# Patient Record
Sex: MALE | Race: ASIAN | NOT HISPANIC OR LATINO | URBAN - METROPOLITAN AREA
[De-identification: names, ages, dates, MRNs, and addresses within clinical notes are randomized per-mention and may not be internally consistent; named-entity substitution may affect disease eponyms.]

---

## 2020-01-22 ENCOUNTER — EMERGENCY (EMERGENCY)
Facility: HOSPITAL | Age: 45
LOS: 0 days | Discharge: HOME | End: 2020-01-22
Attending: EMERGENCY MEDICINE
Payer: MEDICAID

## 2020-01-22 VITALS
HEART RATE: 93 BPM | WEIGHT: 199.96 LBS | DIASTOLIC BLOOD PRESSURE: 84 MMHG | SYSTOLIC BLOOD PRESSURE: 129 MMHG | RESPIRATION RATE: 16 BRPM | TEMPERATURE: 99 F | HEIGHT: 67 IN

## 2020-01-22 DIAGNOSIS — M25.561 PAIN IN RIGHT KNEE: ICD-10-CM

## 2020-01-22 DIAGNOSIS — M25.569 PAIN IN UNSPECIFIED KNEE: ICD-10-CM

## 2020-01-22 DIAGNOSIS — M25.50 PAIN IN UNSPECIFIED JOINT: ICD-10-CM

## 2020-01-22 PROCEDURE — 99283 EMERGENCY DEPT VISIT LOW MDM: CPT

## 2020-01-22 PROCEDURE — 73562 X-RAY EXAM OF KNEE 3: CPT | Mod: 26,RT

## 2020-01-22 RX ORDER — TRAMADOL HYDROCHLORIDE 50 MG/1
50 TABLET ORAL ONCE
Refills: 0 | Status: DISCONTINUED | OUTPATIENT
Start: 2020-01-22 | End: 2020-01-22

## 2020-01-22 RX ADMIN — TRAMADOL HYDROCHLORIDE 50 MILLIGRAM(S): 50 TABLET ORAL at 08:40

## 2020-01-22 NOTE — ED PROVIDER NOTE - LOWER EXTREMITY EXAM, RIGHT
right anteromedial tenderness + clicks no swelling able to fully flex and extend the right knee but increases tenderness with extension/CLICK/TENDERNESS

## 2020-01-22 NOTE — ED PROVIDER NOTE - PROGRESS NOTE DETAILS
Pt is alert and oriented x 3 xray of right knee no acute fx or dislocation with small effusion. Pt's right knee is placed on the knee immobilizer and given a cane and advised to follow up with Dr. Browning orthopedist for possible out patient mri of the right knee and return if symptoms persist or worsen.

## 2020-01-22 NOTE — ED PROVIDER NOTE - PATIENT PORTAL LINK FT
You can access the FollowMyHealth Patient Portal offered by [facility] by registering at the following website: [email or web address]. By joining [employer]’s FollowMyHealth portal, you will also be able to view your health information using other applications (apps) compatible with our system.

## 2020-01-22 NOTE — ED ADULT NURSE NOTE - OBJECTIVE STATEMENT
received pt to bed 24 alert and oriented times 4. pt complaining of right knee pain and swelling since last night. denies trauma. pt evaluated by md. pt undressed. will sent for x ray.

## 2020-01-22 NOTE — ED PROVIDER NOTE - OBJECTIVE STATEMENT
44 years old male c/o right knee pinpoint pain increases to extend to right knee since last night. Pt sts he is a . Pt denies recent hx of trauma, recent long traveling, right leg swelling or calf pain, headache, dizziness, palpitations, blurred visions, light sensitivities, focal/distal weakness or numbness, cough, sob, chest pain, neck/back/hips pain, cough, nausea, vomiting, fever, chills, abd pain, dysuria, or irregular bowel movements.

## 2020-01-22 NOTE — ED PROVIDER NOTE - CONSTITUTIONAL, MLM
normal... Well appearing, awake, alert, oriented to person, place, time/situation and in no apparent distress. Speaking in clear full sentences no nasal flaring no shoulders retractions no diaphoresis, appears comfortable sitting up in the stretcher in a  bright light room

## 2020-01-28 ENCOUNTER — APPOINTMENT (OUTPATIENT)
Dept: ORTHOPEDIC SURGERY | Facility: CLINIC | Age: 45
End: 2020-01-28

## 2020-01-28 NOTE — PHYSICAL EXAM
[de-identified] : General Exam\par \par Well developed, well nourished\par No apparent distress\par Oriented to person, place, and time\par Mood: Normal\par Affect: Normal\par Balance and coordination: Normal\par Gait: Normal\par \par right knee exam\par \par Skin: Clean, dry, intact\par Inspection: No obvious malalignment, no masses, no swelling, no effusion.\par Tenderness: no MJLT. No LJLT. No tenderness over the medial and lateral patella facets. No ttp medial/lateral epicondyle, patella tendon, tibial tubercle, pes anserinus, or proximal fibula.\par ROM: 0 to 130° no pain with deep flexion in both knees\par Stability: Stable to varus, valgus, lachman testing. Negative anterior/posterior drawer.\par Additional tests: Negative McMurrays test, Negative patellar grind test. \par Strength: 5/5 Q/H/TA/GS/EHL, no atrophy\par Neuro: In tact to light touch throughout in dp/sp/tib/marianne/saph nerve districutions, DTR's normal\par Pulses: 2+ DP/PT pulses.\par

## 2020-01-28 NOTE — HISTORY OF PRESENT ILLNESS
[de-identified] : \par \par The patient's past medical history, past surgical history, medications, allergies, and social history were reviewed by me today with the patient and documented accordingly. In addition, the patient's family history, which is noncontributory to this visit, was also reviewed.\par

## 2020-03-01 ENCOUNTER — EMERGENCY (EMERGENCY)
Facility: HOSPITAL | Age: 45
LOS: 0 days | Discharge: HOME | End: 2020-03-01
Attending: EMERGENCY MEDICINE
Payer: MEDICAID

## 2020-03-01 VITALS
RESPIRATION RATE: 18 BRPM | SYSTOLIC BLOOD PRESSURE: 116 MMHG | OXYGEN SATURATION: 97 % | TEMPERATURE: 98 F | DIASTOLIC BLOOD PRESSURE: 79 MMHG | HEART RATE: 93 BPM

## 2020-03-01 VITALS
DIASTOLIC BLOOD PRESSURE: 99 MMHG | TEMPERATURE: 99 F | WEIGHT: 203.93 LBS | SYSTOLIC BLOOD PRESSURE: 134 MMHG | RESPIRATION RATE: 20 BRPM | HEIGHT: 67 IN | HEART RATE: 93 BPM | OXYGEN SATURATION: 95 %

## 2020-03-01 DIAGNOSIS — R05 COUGH: ICD-10-CM

## 2020-03-01 DIAGNOSIS — R09.81 NASAL CONGESTION: ICD-10-CM

## 2020-03-01 DIAGNOSIS — R09.82 POSTNASAL DRIP: ICD-10-CM

## 2020-03-01 DIAGNOSIS — I10 ESSENTIAL (PRIMARY) HYPERTENSION: ICD-10-CM

## 2020-03-01 DIAGNOSIS — R06.02 SHORTNESS OF BREATH: ICD-10-CM

## 2020-03-01 PROCEDURE — 99284 EMERGENCY DEPT VISIT MOD MDM: CPT

## 2020-03-01 RX ORDER — OXYMETAZOLINE HYDROCHLORIDE 0.5 MG/ML
1 SPRAY NASAL ONCE
Refills: 0 | Status: COMPLETED | OUTPATIENT
Start: 2020-03-01 | End: 2020-03-01

## 2020-03-01 RX ORDER — LORATADINE 10 MG/1
10 TABLET ORAL ONCE
Refills: 0 | Status: COMPLETED | OUTPATIENT
Start: 2020-03-01 | End: 2020-03-01

## 2020-03-01 RX ORDER — CETIRIZINE HYDROCHLORIDE 10 MG/1
1 TABLET ORAL
Qty: 10 | Refills: 0
Start: 2020-03-01 | End: 2020-03-10

## 2020-03-01 RX ORDER — IPRATROPIUM/ALBUTEROL SULFATE 18-103MCG
3 AEROSOL WITH ADAPTER (GRAM) INHALATION ONCE
Refills: 0 | Status: COMPLETED | OUTPATIENT
Start: 2020-03-01 | End: 2020-03-01

## 2020-03-01 RX ORDER — BENZOCAINE AND MENTHOL 5; 1 G/100ML; G/100ML
1 LIQUID ORAL ONCE
Refills: 0 | Status: COMPLETED | OUTPATIENT
Start: 2020-03-01 | End: 2020-03-01

## 2020-03-01 RX ORDER — FLUTICASONE PROPIONATE 50 MCG
1 SPRAY, SUSPENSION NASAL ONCE
Refills: 0 | Status: COMPLETED | OUTPATIENT
Start: 2020-03-01 | End: 2020-03-01

## 2020-03-01 RX ORDER — ACETAMINOPHEN 500 MG
650 TABLET ORAL ONCE
Refills: 0 | Status: COMPLETED | OUTPATIENT
Start: 2020-03-01 | End: 2020-03-01

## 2020-03-01 RX ADMIN — OXYMETAZOLINE HYDROCHLORIDE 1 SPRAY(S): 0.5 SPRAY NASAL at 09:19

## 2020-03-01 RX ADMIN — Medication 3 MILLILITER(S): at 09:20

## 2020-03-01 RX ADMIN — BENZOCAINE AND MENTHOL 1 LOZENGE: 5; 1 LIQUID ORAL at 09:19

## 2020-03-01 RX ADMIN — Medication 650 MILLIGRAM(S): at 09:16

## 2020-03-01 RX ADMIN — Medication 1 SPRAY(S): at 09:19

## 2020-03-01 RX ADMIN — LORATADINE 10 MILLIGRAM(S): 10 TABLET ORAL at 09:19

## 2020-03-01 NOTE — ED ADULT TRIAGE NOTE - CHIEF COMPLAINT QUOTE
Pt presents with cough and congestion x 2 weeks. Went to urgent care last Sunday and was given a nasal spray without relief.

## 2020-03-01 NOTE — ED PROVIDER NOTE - PHYSICAL EXAMINATION
Gen: Alert, Well appearing. NAD    Head: NC, AT, PERRL, normal lids/conjunctiva   ENT: patent oropharynx without erythema/exudate, uvula midline, ++ nasal congestion. no significant sinus pressure/tenderness  Neck: supple, no tenderness/meningismus  Pulm: Bilateral clear BS, normal resp effort  CV: RRR, no M/R/G, +dist pulses   Abd: soft, NT/ND, +BS, no guarding/rebound tenderness  Mskel: no edema/erythema/cyanosis   Skin: no rash, no bruising  Neuro: AAOx3, no sensory/motor deficits, CN 2-12 intact,

## 2020-03-01 NOTE — ED PROVIDER NOTE - OBJECTIVE STATEMENT
Pertinent PMH/PSH/FHx/SHx and Review of Systems contained within:     45 y/o M with no pertinent pmhx presents with x2 weeks nasal congestion and pressure. Pt also gets irritated throat and dry cough. Pt reports some occasional SOB related to nasal congestion. Pt was seen at urgent care and was given nasal spray with no relief. Pt denies fevers, CP, palpitations, N/V, body aches, or allergies.     No fever/chills, No photophobia/eye pain/changes in vision, + nasal congestion, No ear pain,  + irritated throat, no dysphagia, No chest pain/palpitations, no SOB, + cough, no wheeze/stridor, No abdominal pain, No N/V/D, no dysuria/frequency/discharge, No neck/back pain, no rash, no changes in neurological status/function. Pertinent PMH/PSH/FHx/SHx and Review of Systems contained within:     43 y/o M with no pertinent pmhx presents with x2 weeks nasal congestion and pressure. Pt also gets irritated throat and dry cough. Pt reports some occasional SOB related to nasal congestion. Pt was seen at urgent care and was given Azelastine nasal spray with no relief. Pt denies fevers, CP, palpitations, N/V, body aches, or allergies.     No fever/chills, No photophobia/eye pain/changes in vision, + nasal congestion, No ear pain,  + irritated throat, no dysphagia, No chest pain/palpitations, no SOB, + cough, no wheeze/stridor, No abdominal pain, No N/V/D, no dysuria/frequency/discharge, No neck/back pain, no rash, no changes in neurological status/function. Pertinent PMH/PSH/FHx/SHx and Review of Systems contained within:     45 y/o M with pmhx HTN presents with x2 weeks nasal congestion and pressure. Pt also gets irritated throat and dry cough. Pt reports some occasional SOB related to nasal congestion. Pt was seen at urgent care and was given Azelastine nasal spray with no relief. Pt denies fevers, CP, palpitations, N/V, body aches, or allergies.     No fever/chills, No photophobia/eye pain/changes in vision, + nasal congestion, No ear pain,  + irritated throat, no dysphagia, No chest pain/palpitations, no SOB, + cough, no wheeze/stridor, No abdominal pain, No N/V/D, no dysuria/frequency/discharge, No neck/back pain, no rash, no changes in neurological status/function.

## 2020-03-01 NOTE — ED ADULT NURSE NOTE - NSIMPLEMENTINTERV_GEN_ALL_ED
Implemented All Universal Safety Interventions:  [place] to call system. Call bell, personal items and telephone within reach. Instruct patient to call for assistance. Room bathroom lighting operational. Non-slip footwear when patient is off stretcher. Physically safe environment: no spills, clutter or unnecessary equipment. Stretcher in lowest position, wheels locked, appropriate side rails in place.

## 2020-03-01 NOTE — ED PROVIDER NOTE - CARE PROVIDER_API CALL
Cuco Malik)  Otolaryngology  [address] [place] [address]  Phone: [phone]  Fax: [phone]  Follow Up Time:

## 2020-03-01 NOTE — ED PROVIDER NOTE - NSFOLLOWUPINSTRUCTIONS_ED_ALL_ED_FT
Oxymetazolone (Afrin), You can take 2 sprays each nostril twice a day as needed for nasal congestion. DO NOT TAKE MORE THAN 3 days.     Fluticasone nasal spray: Spray both nostrils once a day for 30 days.     Take the cetirizine once a day for the next days.     Follow up with your primary care doctor within the next 24-48 hours and bring copy of your results.  Return to the Emergency Department for worsening or persistent symptoms or any other concerns incl. chest pain, shortness of breath, dizziness, inability to tolerate oral intake, fever.  Rest, drink plenty of fluids.  Advance activity as tolerated.  Continue all previously prescribed medications as directed. You can use Tylenol 650 mg every 4 hours for pain

## 2020-03-01 NOTE — ED PROVIDER NOTE - PATIENT PORTAL LINK FT
You can access the FollowMyHealth Patient Portal offered by [facility] by registering at the following website: [email or web address]. By joining [email or web address]’s FollowMyHealth portal, you will also be able to view your health information using other applications (apps) compatible with our system.

## 2021-05-25 NOTE — ED PROVIDER NOTE - CROS ED GU ALL NEG
Patient: Luis Fernando Araujo Date of Service: 2021   : 1988 MRN: [id number]     SUBJECTIVE:   HISTORY OF PRESENT ILLNESS:  Luis Fernando Araujo is a 33 year old male who presents today for knee and back issues with his wife Daisy.    Hunched over all his life  - no pain at rest  - when straightens back, has achiness  - when younger, parents were told they'd have to break his spine and reposition it really fix the posture; they declined at that time  - does not want it to worsen when he is older  - back braces in past not helpful    Left leg/left hip pain  - for months  - radiates to left buttocks  - outer part of leg as well as inner hip  - no numbness/tingling  - does not routinely exercise  - does work around the house, squats  - pain then worsens  - does not stretch in general either  - gait not affected  - sleep not disrupted by pain from leg  - not really taking meds for it either        PAST MEDICAL HISTORY:  Past Medical History:   Diagnosis Date   • Intracranial injury with loss of consciousness of 30 minutes or less (CMS/[facility]) 2018   • Post concussion syndrome 2017       MEDICATIONS:  No current outpatient medications on file.     No current facility-administered medications for this visit.       ALLERGIES:  No Known Allergies    PAST SURGICAL HISTORY:  History reviewed. No pertinent surgical history.    FAMILY HISTORY:  History reviewed. No pertinent family history.    SOCIAL HISTORY:  Social History     Tobacco Use   • Smoking status: Current Some Day Smoker     Types: Cigarettes   • Smokeless tobacco: Never Used   Substance Use Topics   • Alcohol use: Yes     Comment: occ   • Drug use: Yes     Types: Marijuana       Review of Systems   Constitutional: Negative for chills, fever and malaise/fatigue.   HENT: Negative.    Eyes: Negative for blurred vision, discharge and redness.   Respiratory: Negative for cough, shortness of breath and wheezing.    Cardiovascular: Negative for chest pain,  palpitations and leg swelling.   Gastrointestinal: Negative.    Genitourinary: Negative.    Musculoskeletal: Negative for falls.   Skin: Negative for rash.   Neurological: Negative for dizziness, sensory change, speech change, weakness and headaches.   Endo/Heme/Allergies: Negative.    Psychiatric/Behavioral: Negative.    All other systems reviewed and are negative.        OBJECTIVE:     Visit Vitals  /60 (BP Location: LUE - Left upper extremity, Patient Position: Sitting, Cuff Size: Regular)   Pulse 78   Temp 97.6 °F (36.4 °C) (Temporal)   Ht 6' 3.75\" (1.924 m)   Wt 62.1 kg (137 lb)   SpO2 99%   BMI 16.79 kg/m²       Physical Exam   Constitutional: He is oriented to person, place, and time and well-developed, well-nourished, and in no distress.   HENT:   Head: Normocephalic and atraumatic.   Right Ear: External ear normal.   Left Ear: External ear normal.   Nose: Nose normal.   Eyes: Conjunctivae and EOM are normal. Right eye exhibits no discharge. Left eye exhibits no discharge. No scleral icterus.   Pulmonary/Chest: Effort normal. He exhibits deformity (kyphotic chest).   Musculoskeletal:         General: No edema. Normal range of motion.      Cervical back: Normal range of motion and neck supple.      Thoracic back: Deformity (severe kyphosis with anteriorly rotated shoulders bilaterally at resting position) present. No swelling, edema, tenderness or bony tenderness. Normal range of motion.      Left hip: Tenderness (ITB at insertion at hip and along ITB; also TTP of VMO/hip flexors & extensors) present. No swelling, deformity or bony tenderness. Normal range of motion. Normal strength (negative ANAHI).   Neurological: He is alert and oriented to person, place, and time. He has normal reflexes. No cranial nerve deficit. Gait normal. Coordination normal.   Skin: Skin is warm and dry. No rash noted. He is not diaphoretic. No erythema. No pallor.   Psychiatric: Mood, memory, affect and judgment normal.    Vitals reviewed.      DIAGNOSTIC STUDIES:   LAB RESULTS:    No results found for this visit on 05/25/21.    Assessment AND PLAN:     Problem List Items Addressed This Visit        Nervous    Left leg pain     IT band syndrome - will try foam roller at least nightly at end of day.    Also strain(s) of VMO vs hip extensors vs hip flexors  - does not stretch much nor exercise routinely  - demonstrated stretches to do at the end of the day  - encouraged more routine exercise  - may need PT eventually            Musculoskeletal    Kyphosis (acquired) (postural) - Primary     Does not want it to get worse as he ages.  XR ordered of entire spine.  Sending him to ortho spine surgeon at [facility] as well, Dr. Mukherjee.         Relevant Orders    XR THORACIC SPINE 3 VIEWS    XR CERVICAL SPINE 4 OR 5 VIEWS    XR LUMBAR SPINE 2 OR 3 VIEWS        Please take medications as directed.    Instructions provided as documented in the AVS.    The patient and wife indicated understanding of the diagnosis and agreed with the plan of care.    Return in about 1 month (around 6/25/2021) for physical.   negative...

## 2022-12-16 NOTE — ED PROVIDER NOTE - EYES NEGATIVE STATEMENT, MLM
Koko Baum MD  [facility] Clinical Support Staff 10 minutes ago (5:50 PM)     MS  Patient is already on steroids and cough medicine.  Provider mentioned sending in a Z-Markel if symptoms continue.  A Z-Markel has been sent to the pharmacy.  If the cough continues to worsen or patient develops fever they need to be re-evaluated immediately      You routed conversation to [facility] Results; [facility] Clinical Support Staff 2 hours ago (3:45 PM)     Ngozi Ross  [facility] Clinical Support Staff 2 hours ago (3:23 PM)     SM  Patient was seen on 12/13/2022 diagnosed with Bronchitis. The patient's mother is calling today stating that the cough is worsening and she wants to know if we can prescribe something to help? The pharmacy she uses is the [facility] in [place]. The mother's telephone is [phone].   Please Advise.          Called and spoke with pts mom and let her know that rx was sent to if he worsens to follow up- mom verbalized understanding   
no discharge, no irritation, no pain, no redness, and no visual changes.

## 2023-06-10 ENCOUNTER — INPATIENT (INPATIENT)
Facility: HOSPITAL | Age: 48
LOS: 1 days | Discharge: HOME | End: 2023-06-12
Attending: HOSPITALIST | Admitting: HOSPITALIST
Payer: MEDICAID

## 2023-06-10 VITALS
WEIGHT: 210.1 LBS | OXYGEN SATURATION: 98 % | HEIGHT: 67 IN | SYSTOLIC BLOOD PRESSURE: 153 MMHG | DIASTOLIC BLOOD PRESSURE: 96 MMHG | TEMPERATURE: 98 F | HEART RATE: 89 BPM | RESPIRATION RATE: 18 BRPM

## 2023-06-10 PROBLEM — I10 ESSENTIAL (PRIMARY) HYPERTENSION: Chronic | Status: ACTIVE | Noted: 2020-03-01

## 2023-06-10 PROCEDURE — 99285 EMERGENCY DEPT VISIT HI MDM: CPT

## 2023-06-10 NOTE — ED ADULT TRIAGE NOTE - CHIEF COMPLAINT QUOTE
PMH of HTN  C/o abdominal pain since yesterday. Denies nausea, vomiting, diarrhea. LBM today 2hrs ago.

## 2023-06-11 DIAGNOSIS — I10 ESSENTIAL (PRIMARY) HYPERTENSION: ICD-10-CM

## 2023-06-11 DIAGNOSIS — K85.90 ACUTE PANCREATITIS WITHOUT NECROSIS OR INFECTION, UNSPECIFIED: ICD-10-CM

## 2023-06-11 LAB
ALBUMIN SERPL ELPH-MCNC: 3.5 G/DL — SIGNIFICANT CHANGE UP (ref 3.3–5)
ALP SERPL-CCNC: 71 U/L — SIGNIFICANT CHANGE UP (ref 40–120)
ALT FLD-CCNC: 38 U/L — SIGNIFICANT CHANGE UP (ref 12–78)
ANION GAP SERPL CALC-SCNC: 6 MMOL/L — SIGNIFICANT CHANGE UP (ref 5–17)
APPEARANCE UR: CLEAR — SIGNIFICANT CHANGE UP
AST SERPL-CCNC: 17 U/L — SIGNIFICANT CHANGE UP (ref 15–37)
BASOPHILS # BLD AUTO: 0.1 K/UL — SIGNIFICANT CHANGE UP (ref 0–0.2)
BASOPHILS NFR BLD AUTO: 1.1 % — SIGNIFICANT CHANGE UP (ref 0–2)
BILIRUB SERPL-MCNC: 1.3 MG/DL — HIGH (ref 0.2–1.2)
BILIRUB UR-MCNC: NEGATIVE — SIGNIFICANT CHANGE UP
BLD GP AB SCN SERPL QL: SIGNIFICANT CHANGE UP
BUN SERPL-MCNC: 11 MG/DL — SIGNIFICANT CHANGE UP (ref 7–23)
CALCIUM SERPL-MCNC: 9.4 MG/DL — SIGNIFICANT CHANGE UP (ref 8.5–10.1)
CHLORIDE SERPL-SCNC: 105 MMOL/L — SIGNIFICANT CHANGE UP (ref 96–108)
CO2 SERPL-SCNC: 29 MMOL/L — SIGNIFICANT CHANGE UP (ref 22–31)
COLOR SPEC: YELLOW — SIGNIFICANT CHANGE UP
CREAT SERPL-MCNC: 1.15 MG/DL — SIGNIFICANT CHANGE UP (ref 0.5–1.3)
DIFF PNL FLD: NEGATIVE — SIGNIFICANT CHANGE UP
EGFR: 78 ML/MIN/1.73M2 — SIGNIFICANT CHANGE UP
EOSINOPHIL # BLD AUTO: 0.57 K/UL — HIGH (ref 0–0.5)
EOSINOPHIL NFR BLD AUTO: 6.3 % — HIGH (ref 0–6)
GLUCOSE SERPL-MCNC: 100 MG/DL — HIGH (ref 70–99)
GLUCOSE UR QL: NEGATIVE MG/DL — SIGNIFICANT CHANGE UP
HCT VFR BLD CALC: 47.1 % — SIGNIFICANT CHANGE UP (ref 39–50)
HGB BLD-MCNC: 15.7 G/DL — SIGNIFICANT CHANGE UP (ref 13–17)
IMM GRANULOCYTES NFR BLD AUTO: 0.2 % — SIGNIFICANT CHANGE UP (ref 0–0.9)
KETONES UR-MCNC: NEGATIVE — SIGNIFICANT CHANGE UP
LACTATE SERPL-SCNC: 0.9 MMOL/L — SIGNIFICANT CHANGE UP (ref 0.7–2)
LEUKOCYTE ESTERASE UR-ACNC: NEGATIVE — SIGNIFICANT CHANGE UP
LIDOCAIN IGE QN: 3222 U/L — HIGH (ref 73–393)
LYMPHOCYTES # BLD AUTO: 1.87 K/UL — SIGNIFICANT CHANGE UP (ref 1–3.3)
LYMPHOCYTES # BLD AUTO: 20.7 % — SIGNIFICANT CHANGE UP (ref 13–44)
MCHC RBC-ENTMCNC: 29.7 PG — SIGNIFICANT CHANGE UP (ref 27–34)
MCHC RBC-ENTMCNC: 33.3 G/DL — SIGNIFICANT CHANGE UP (ref 32–36)
MCV RBC AUTO: 89.2 FL — SIGNIFICANT CHANGE UP (ref 80–100)
MONOCYTES # BLD AUTO: 0.79 K/UL — SIGNIFICANT CHANGE UP (ref 0–0.9)
MONOCYTES NFR BLD AUTO: 8.8 % — SIGNIFICANT CHANGE UP (ref 2–14)
NEUTROPHILS # BLD AUTO: 5.67 K/UL — SIGNIFICANT CHANGE UP (ref 1.8–7.4)
NEUTROPHILS NFR BLD AUTO: 62.9 % — SIGNIFICANT CHANGE UP (ref 43–77)
NITRITE UR-MCNC: NEGATIVE — SIGNIFICANT CHANGE UP
NRBC # BLD: 0 /100 WBCS — SIGNIFICANT CHANGE UP (ref 0–0)
PH UR: 7 — SIGNIFICANT CHANGE UP (ref 5–8)
PLATELET # BLD AUTO: 303 K/UL — SIGNIFICANT CHANGE UP (ref 150–400)
POTASSIUM SERPL-MCNC: 4.3 MMOL/L — SIGNIFICANT CHANGE UP (ref 3.5–5.3)
POTASSIUM SERPL-SCNC: 4.3 MMOL/L — SIGNIFICANT CHANGE UP (ref 3.5–5.3)
PROT SERPL-MCNC: 7 GM/DL — SIGNIFICANT CHANGE UP (ref 6–8.3)
PROT UR-MCNC: NEGATIVE MG/DL — SIGNIFICANT CHANGE UP
RAPID RVP RESULT: SIGNIFICANT CHANGE UP
RBC # BLD: 5.28 M/UL — SIGNIFICANT CHANGE UP (ref 4.2–5.8)
RBC # FLD: 13.2 % — SIGNIFICANT CHANGE UP (ref 10.3–14.5)
SARS-COV-2 RNA SPEC QL NAA+PROBE: SIGNIFICANT CHANGE UP
SODIUM SERPL-SCNC: 140 MMOL/L — SIGNIFICANT CHANGE UP (ref 135–145)
SP GR SPEC: 1.01 — SIGNIFICANT CHANGE UP (ref 1.01–1.02)
TROPONIN I, HIGH SENSITIVITY RESULT: 4.1 NG/L — SIGNIFICANT CHANGE UP
UROBILINOGEN FLD QL: NEGATIVE MG/DL — SIGNIFICANT CHANGE UP
WBC # BLD: 9.02 K/UL — SIGNIFICANT CHANGE UP (ref 3.8–10.5)
WBC # FLD AUTO: 9.02 K/UL — SIGNIFICANT CHANGE UP (ref 3.8–10.5)

## 2023-06-11 PROCEDURE — 93010 ELECTROCARDIOGRAM REPORT: CPT

## 2023-06-11 PROCEDURE — 74177 CT ABD & PELVIS W/CONTRAST: CPT | Mod: 26,MA

## 2023-06-11 PROCEDURE — 99223 1ST HOSP IP/OBS HIGH 75: CPT

## 2023-06-11 PROCEDURE — 71045 X-RAY EXAM CHEST 1 VIEW: CPT | Mod: 26

## 2023-06-11 RX ORDER — ONDANSETRON 8 MG/1
4 TABLET, FILM COATED ORAL EVERY 8 HOURS
Refills: 0 | Status: DISCONTINUED | OUTPATIENT
Start: 2023-06-11 | End: 2023-06-12

## 2023-06-11 RX ORDER — LOSARTAN POTASSIUM 100 MG/1
0 TABLET, FILM COATED ORAL
Qty: 0 | Refills: 0 | DISCHARGE

## 2023-06-11 RX ORDER — MORPHINE SULFATE 50 MG/1
4 CAPSULE, EXTENDED RELEASE ORAL EVERY 6 HOURS
Refills: 0 | Status: DISCONTINUED | OUTPATIENT
Start: 2023-06-11 | End: 2023-06-12

## 2023-06-11 RX ORDER — NALOXONE HYDROCHLORIDE 4 MG/.1ML
0.4 SPRAY NASAL ONCE
Refills: 0 | Status: DISCONTINUED | OUTPATIENT
Start: 2023-06-11 | End: 2023-06-12

## 2023-06-11 RX ORDER — IOHEXOL 300 MG/ML
30 INJECTION, SOLUTION INTRAVENOUS ONCE
Refills: 0 | Status: COMPLETED | OUTPATIENT
Start: 2023-06-11 | End: 2023-06-11

## 2023-06-11 RX ORDER — AMLODIPINE BESYLATE 2.5 MG/1
5 TABLET ORAL DAILY
Refills: 0 | Status: DISCONTINUED | OUTPATIENT
Start: 2023-06-11 | End: 2023-06-12

## 2023-06-11 RX ORDER — AMLODIPINE BESYLATE 2.5 MG/1
1 TABLET ORAL
Qty: 0 | Refills: 0 | DISCHARGE

## 2023-06-11 RX ORDER — MORPHINE SULFATE 50 MG/1
2 CAPSULE, EXTENDED RELEASE ORAL EVERY 6 HOURS
Refills: 0 | Status: DISCONTINUED | OUTPATIENT
Start: 2023-06-11 | End: 2023-06-12

## 2023-06-11 RX ORDER — MORPHINE SULFATE 50 MG/1
2 CAPSULE, EXTENDED RELEASE ORAL ONCE
Refills: 0 | Status: DISCONTINUED | OUTPATIENT
Start: 2023-06-11 | End: 2023-06-11

## 2023-06-11 RX ORDER — ACETAMINOPHEN 500 MG
650 TABLET ORAL EVERY 6 HOURS
Refills: 0 | Status: DISCONTINUED | OUTPATIENT
Start: 2023-06-11 | End: 2023-06-12

## 2023-06-11 RX ORDER — PANTOPRAZOLE SODIUM 20 MG/1
40 TABLET, DELAYED RELEASE ORAL DAILY
Refills: 0 | Status: DISCONTINUED | OUTPATIENT
Start: 2023-06-11 | End: 2023-06-12

## 2023-06-11 RX ORDER — ACETAMINOPHEN 500 MG
975 TABLET ORAL ONCE
Refills: 0 | Status: COMPLETED | OUTPATIENT
Start: 2023-06-11 | End: 2023-06-11

## 2023-06-11 RX ORDER — SODIUM CHLORIDE 9 MG/ML
1000 INJECTION INTRAMUSCULAR; INTRAVENOUS; SUBCUTANEOUS
Refills: 0 | Status: DISCONTINUED | OUTPATIENT
Start: 2023-06-11 | End: 2023-06-12

## 2023-06-11 RX ORDER — LANOLIN ALCOHOL/MO/W.PET/CERES
3 CREAM (GRAM) TOPICAL AT BEDTIME
Refills: 0 | Status: DISCONTINUED | OUTPATIENT
Start: 2023-06-11 | End: 2023-06-12

## 2023-06-11 RX ORDER — SODIUM CHLORIDE 9 MG/ML
1000 INJECTION INTRAMUSCULAR; INTRAVENOUS; SUBCUTANEOUS ONCE
Refills: 0 | Status: COMPLETED | OUTPATIENT
Start: 2023-06-11 | End: 2023-06-11

## 2023-06-11 RX ADMIN — Medication 30 MILLILITER(S): at 01:00

## 2023-06-11 RX ADMIN — AMLODIPINE BESYLATE 5 MILLIGRAM(S): 2.5 TABLET ORAL at 06:24

## 2023-06-11 RX ADMIN — SODIUM CHLORIDE 125 MILLILITER(S): 9 INJECTION INTRAMUSCULAR; INTRAVENOUS; SUBCUTANEOUS at 06:26

## 2023-06-11 RX ADMIN — Medication 975 MILLIGRAM(S): at 00:59

## 2023-06-11 RX ADMIN — MORPHINE SULFATE 2 MILLIGRAM(S): 50 CAPSULE, EXTENDED RELEASE ORAL at 02:58

## 2023-06-11 RX ADMIN — IOHEXOL 30 MILLILITER(S): 300 INJECTION, SOLUTION INTRAVENOUS at 01:00

## 2023-06-11 RX ADMIN — PANTOPRAZOLE SODIUM 40 MILLIGRAM(S): 20 TABLET, DELAYED RELEASE ORAL at 11:48

## 2023-06-11 RX ADMIN — SODIUM CHLORIDE 125 MILLILITER(S): 9 INJECTION INTRAMUSCULAR; INTRAVENOUS; SUBCUTANEOUS at 18:13

## 2023-06-11 RX ADMIN — SODIUM CHLORIDE 1000 MILLILITER(S): 9 INJECTION INTRAMUSCULAR; INTRAVENOUS; SUBCUTANEOUS at 00:59

## 2023-06-11 NOTE — ED PROVIDER NOTE - CLINICAL SUMMARY MEDICAL DECISION MAKING FREE TEXT BOX
47 yo M with epigastric abd pain x 2 days, concerning for pancreatitis, acs, doubt ischemic bowel, covid  -basic labs, type and screen, ua, cx, lactate, lipase, rvp, trop, CT ab/pel, CXR, ekg, iv, hydration, tylenol, maalox, npo, monitor  -f/u results, reeval

## 2023-06-11 NOTE — PATIENT PROFILE ADULT - FALL HARM RISK - UNIVERSAL INTERVENTIONS
Bed in lowest position, wheels locked, appropriate side rails in place/Call bell, personal items and telephone in reach/Instruct patient to call for assistance before getting out of bed or chair/Non-slip footwear when patient is out of bed/[place] to call system/Physically safe environment - no spills, clutter or unnecessary equipment/Purposeful Proactive Rounding/Room/bathroom lighting operational, light cord in reach

## 2023-06-11 NOTE — H&P ADULT - ASSESSMENT
47 yo M with pmh of HTN only here with  2 days of severe epigastric abd pain,  admit for pancreatitis.

## 2023-06-11 NOTE — ED PROVIDER NOTE - PROGRESS NOTE DETAILS
+ pancreatitis on CT, pt. still having epigastric pain, will admit for further care, pt. informed of results thus far, agreeable with plan of care

## 2023-06-11 NOTE — CHART NOTE - NSCHARTNOTEFT_GEN_A_CORE
Patient admitted for pancreatitis. Patient seen and examined this AM. Continues to have abdominal pain. Will continue with CLD for now. Pain control with morphine. IVF  F/u pending labs

## 2023-06-11 NOTE — ED ADULT NURSE NOTE - OBJECTIVE STATEMENT
c/o abdominal pain x2 days, pain located in the upper abdominal quadrant. denies any nausea, vomiting or diarrhea.

## 2023-06-11 NOTE — ED PROVIDER NOTE - OBJECTIVE STATEMENT
47 yo M with 2 days of severe epigastric abd pain, started after eating very spicy chips.  Pt. denies other inciting event/trauma.  Pt. feels like his epigastrium is swollen, which is not normal for him.  He feels otherwise well.  ROS: negative for fever, cough, headache, chest pain, shortness of breath, nausea, vomiting, diarrhea, rash, paresthesia, and focal weakness--all other systems reviewed are negative.   PMH: HTN; Meds: amlodipine; SH: Denies smoking/drinking/drug use

## 2023-06-11 NOTE — ED PROVIDER NOTE - PHYSICAL EXAMINATION
Vitals: HTN at 155/103, otherwise WNL  Gen: AAOx3, NAD, sitting uncomfortably in stretcher, non-toxic   Head: ncat, perrla, eomi b/l  Neck: supple, no lymphadenopathy, no midline deviation  Heart: rrr, no m/r/g  Lungs: CTA b/l, no rales/ronchi/wheezes  Abd: soft, tender in epigastrium, non-distended, no rebound or guarding  Ext: no clubbing/cyanosis/edema  Neuro: sensation and muscle strength intact b/l, steady gait

## 2023-06-11 NOTE — ED PROVIDER NOTE - CARE PLAN
1 Principal Discharge DX:	Epigastric abdominal pain   Principal Discharge DX:	Epigastric abdominal pain  Secondary Diagnosis:	Acute pancreatitis

## 2023-06-11 NOTE — H&P ADULT - NSHPREVIEWOFSYSTEMS_GEN_ALL_CORE
REVIEW OF SYSTEMS:    CONSTITUTIONAL: No weakness, fevers or chills  EYES/ENT: No visual changes;  No vertigo or throat pain   NECK: No pain or stiffness  RESPIRATORY: No cough, wheezing, hemoptysis; No shortness of breath  CARDIOVASCULAR: No chest pain or palpitations  GASTROINTESTINAL: + epigastric pain. No nausea, vomiting, or hematemesis; No diarrhea or constipation. No melena or hematochezia.  GENITOURINARY: No dysuria, frequency or hematuria  NEUROLOGICAL: No numbness or weakness  SKIN: No itching, rashes

## 2023-06-11 NOTE — H&P ADULT - NSHPPHYSICALEXAM_GEN_ALL_CORE
VITALS:   T(C): 36.8 (06-11-23 @ 06:38), Max: 36.9 (06-10-23 @ 21:20)  HR: 78 (06-11-23 @ 06:38) (78 - 89)  BP: 130/88 (06-11-23 @ 06:38) (126/85 - 155/103)  RR: 18 (06-11-23 @ 06:38) (18 - 18)  SpO2: 95% (06-11-23 @ 06:38) (95% - 98%)    GENERAL: NAD, lying in bed comfortably  HEAD:  Atraumatic, Normocephalic  EYES: EOMI, PERRLA, conjunctiva and sclera clear  ENT: Moist mucous membranes  NECK: Supple, No JVD  CHEST/LUNG: Clear to auscultation bilaterally; No rales, rhonchi, wheezing, or rubs. Unlabored respirations  HEART: Regular rate and rhythm; No murmurs, rubs, or gallops  ABDOMEN: BSx4; Soft, nontender, nondistended  EXTREMITIES:  2+ Peripheral Pulses, brisk capillary refill. No clubbing, cyanosis, or edema  NERVOUS SYSTEM:  A&Ox3, no focal deficits   SKIN: No rashes or lesions

## 2023-06-11 NOTE — H&P ADULT - HISTORY OF PRESENT ILLNESS
47 yo M with pmh of HTN only here with  2 days of severe epigastric abd pain, started after eating very spicy chips.  Pt. denies other inciting event/trauma.  Pt. feels like his epigastrium is swollen, which is not normal for him.  He feels otherwise well.: negative for fever, cough, headache, chest pain, shortness of breath, nausea, vomiting, diarrhea, rash, paresthesia, and focal weakness-    Pt denies heavy drinking or hx of pancreatitis. Denies any HLD or gallstones hx.

## 2023-06-11 NOTE — H&P ADULT - NSHPLABSRESULTS_GEN_ALL_CORE
=======================================================  Labs:                        15.7   9.02  )-----------( 303      ( 11 Jun 2023 00:56 )             47.1     06-11    140  |  105  |  11  ----------------------------<  100<H>  4.3   |  29  |  1.15    Ca    9.4      11 Jun 2023 00:56    TPro  7.0  /  Alb  3.5  /  TBili  1.3<H>  /  DBili  x   /  AST  17  /  ALT  38  /  AlkPhos  71  06-11      Creatinine, Serum: 1.15 mg/dL (06-11-23 @ 00:56)            WBC Count: 9.02 K/uL (06-11-23 @ 00:56)    SARS-CoV-2: NotDetec (06-11-23 @ 00:58)      Alkaline Phosphatase, Serum: 71 U/L (06-11-23 @ 00:56)  Alanine Aminotransferase (ALT/SGPT): 38 U/L (06-11-23 @ 00:56)  Aspartate Aminotransferase (AST/SGOT): 17 U/L (06-11-23 @ 00:56)  Bilirubin Total, Serum: 1.3 mg/dL (06-11-23 @ 00:56)      < from: CT Abdomen and Pelvis w/ Oral Cont and w/ IV Cont (06.11.23 @ 02:08) >    LOWER CHEST: Minimal bibasilar subsegmental atelectasis.    LIVER: Steatosis. Right lobe liver enlarged measuring 22 cm. 8 mm   partially opacified hemangioma lobe liver measuring 3.5 x 2.8 cm.  BILE DUCTS: Normal caliber.  GALLBLADDER: Within normal limits.  SPLEEN: Within normal limits.  PANCREAS: Mild peripancreatic inflammatory changes adjacent to the   pancreatic head extending into the anterior pararenal space consistent   with manifestations of acute pancreatitis. No evidence pancreatic   necrosis. No pseudocyst or abscess.  ADRENALS: Within normal limits.  KIDNEYS/URETERS: Within normal limits.    BLADDER: Within normal limits.  REPRODUCTIVE ORGANS: Prostate within normal limits.    Scattered diverticula without diverticulitis. No colitis.    BOWEL: No bowel obstruction. Appendix is normal.  PERITONEUM: No ascites.  No free air or abscess.  VESSELS: Within normal limits.  RETROPERITONEUM/LYMPH NODES: No lymphadenopathy.  ABDOMINAL WALL: Within normal limits.  BONES: Within normal limits.    IMPRESSION:  Steatosis and hepatomegaly.    Mild peripancreatic inflammatory changes adjacent to the pancreatic head   extending into the anterior pararenal space consistent with   manifestations of acute pancreatitis. No evidence pancreatic necrosis. No   pseudocyst or abscess.    No bowel obstruction, free fluid, free air or abscess.    Please refer to detailed findings otherwise described above.    --- End of Report ---    < end of copied text >

## 2023-06-12 ENCOUNTER — TRANSCRIPTION ENCOUNTER (OUTPATIENT)
Age: 48
End: 2023-06-12

## 2023-06-12 VITALS
TEMPERATURE: 98 F | HEART RATE: 99 BPM | SYSTOLIC BLOOD PRESSURE: 139 MMHG | DIASTOLIC BLOOD PRESSURE: 97 MMHG | OXYGEN SATURATION: 98 % | RESPIRATION RATE: 18 BRPM

## 2023-06-12 LAB
ALBUMIN SERPL ELPH-MCNC: 3.2 G/DL — LOW (ref 3.3–5)
ALP SERPL-CCNC: 71 U/L — SIGNIFICANT CHANGE UP (ref 40–120)
ALT FLD-CCNC: 33 U/L — SIGNIFICANT CHANGE UP (ref 12–78)
ANION GAP SERPL CALC-SCNC: 7 MMOL/L — SIGNIFICANT CHANGE UP (ref 5–17)
AST SERPL-CCNC: 14 U/L — LOW (ref 15–37)
BILIRUB SERPL-MCNC: 1.2 MG/DL — SIGNIFICANT CHANGE UP (ref 0.2–1.2)
BUN SERPL-MCNC: 9 MG/DL — SIGNIFICANT CHANGE UP (ref 7–23)
CALCIUM SERPL-MCNC: 8.7 MG/DL — SIGNIFICANT CHANGE UP (ref 8.5–10.1)
CHLORIDE SERPL-SCNC: 107 MMOL/L — SIGNIFICANT CHANGE UP (ref 96–108)
CO2 SERPL-SCNC: 25 MMOL/L — SIGNIFICANT CHANGE UP (ref 22–31)
CREAT SERPL-MCNC: 0.96 MG/DL — SIGNIFICANT CHANGE UP (ref 0.5–1.3)
CULTURE RESULTS: NO GROWTH — SIGNIFICANT CHANGE UP
EGFR: 96 ML/MIN/1.73M2 — SIGNIFICANT CHANGE UP
GLUCOSE SERPL-MCNC: 82 MG/DL — SIGNIFICANT CHANGE UP (ref 70–99)
HCT VFR BLD CALC: 46.1 % — SIGNIFICANT CHANGE UP (ref 39–50)
HGB BLD-MCNC: 15.2 G/DL — SIGNIFICANT CHANGE UP (ref 13–17)
MAGNESIUM SERPL-MCNC: 2.2 MG/DL — SIGNIFICANT CHANGE UP (ref 1.6–2.6)
MCHC RBC-ENTMCNC: 29.5 PG — SIGNIFICANT CHANGE UP (ref 27–34)
MCHC RBC-ENTMCNC: 33 G/DL — SIGNIFICANT CHANGE UP (ref 32–36)
MCV RBC AUTO: 89.5 FL — SIGNIFICANT CHANGE UP (ref 80–100)
NRBC # BLD: 0 /100 WBCS — SIGNIFICANT CHANGE UP (ref 0–0)
PHOSPHATE SERPL-MCNC: 2.7 MG/DL — SIGNIFICANT CHANGE UP (ref 2.5–4.5)
PLATELET # BLD AUTO: 289 K/UL — SIGNIFICANT CHANGE UP (ref 150–400)
POTASSIUM SERPL-MCNC: 3.9 MMOL/L — SIGNIFICANT CHANGE UP (ref 3.5–5.3)
POTASSIUM SERPL-SCNC: 3.9 MMOL/L — SIGNIFICANT CHANGE UP (ref 3.5–5.3)
PROT SERPL-MCNC: 6.6 GM/DL — SIGNIFICANT CHANGE UP (ref 6–8.3)
RBC # BLD: 5.15 M/UL — SIGNIFICANT CHANGE UP (ref 4.2–5.8)
RBC # FLD: 13 % — SIGNIFICANT CHANGE UP (ref 10.3–14.5)
SODIUM SERPL-SCNC: 139 MMOL/L — SIGNIFICANT CHANGE UP (ref 135–145)
SPECIMEN SOURCE: SIGNIFICANT CHANGE UP
WBC # BLD: 8.67 K/UL — SIGNIFICANT CHANGE UP (ref 3.8–10.5)
WBC # FLD AUTO: 8.67 K/UL — SIGNIFICANT CHANGE UP (ref 3.8–10.5)

## 2023-06-12 PROCEDURE — 99239 HOSP IP/OBS DSCHRG MGMT >30: CPT

## 2023-06-12 RX ADMIN — AMLODIPINE BESYLATE 5 MILLIGRAM(S): 2.5 TABLET ORAL at 05:50

## 2023-06-12 RX ADMIN — PANTOPRAZOLE SODIUM 40 MILLIGRAM(S): 20 TABLET, DELAYED RELEASE ORAL at 11:26

## 2023-06-12 NOTE — DISCHARGE NOTE NURSING/CASE MANAGEMENT/SOCIAL WORK - PATIENT PORTAL LINK FT
You can access the FollowMyHealth Patient Portal offered by [facility] by registering at the following website: [email or web address]. By joining [employer]’s FollowMyHealth portal, you will also be able to view your health information using other applications (apps) compatible with our system.

## 2023-06-12 NOTE — CHART NOTE - NSCHARTNOTEFT_GEN_A_CORE
To Whom It May Concern,       HoEloy  was admitted on   6/112023 , treated and discharged on 6/12/20023 . He is excused from work until 6/19/23 recommended to     If any questions or concerns please feel free to call    Thank you for your consideration    Nilo Augustin MD   [phone]

## 2023-06-12 NOTE — DISCHARGE NOTE PROVIDER - NSDCCPCAREPLAN_GEN_ALL_CORE_FT
PRINCIPAL DISCHARGE DIAGNOSIS  Diagnosis: Acute pancreatitis  Assessment and Plan of Treatment:       SECONDARY DISCHARGE DIAGNOSES  Diagnosis: HTN (hypertension)  Assessment and Plan of Treatment:     Diagnosis: Acute pancreatitis  Assessment and Plan of Treatment:

## 2023-06-12 NOTE — PROGRESS NOTE ADULT - ASSESSMENT
49 yo M with pmh of HTN only here with  2 days of severe epigastric abd pain,  admit for pancreatitis    patient has improved abdominal pain if tolerates diet may dc home

## 2023-06-12 NOTE — DISCHARGE NOTE PROVIDER - NSDCMRMEDTOKEN_GEN_ALL_CORE_FT
amLODIPine 5 mg oral tablet: 1 tab(s) orally once a day  cetirizine 10 mg oral tablet, dispersible: 1 tab(s) orally once a day   LOSARTAN POTASSIUM 25 MG TAB: TAKE 1 TABLET BY MOUTH EVERY DAY

## 2023-06-12 NOTE — DISCHARGE NOTE PROVIDER - CARE PROVIDER_API CALL
pmd, your  Phone: (   )    -  Fax: (   )    -  Follow Up Time:     Rajesh Perdomo  Gastroenterology  [address] [place] [address]  Phone: [phone]  Fax: [phone]  Follow Up Time:

## 2023-06-12 NOTE — DISCHARGE NOTE PROVIDER - HOSPITAL COURSE
HPI:  49 yo M with pmh of HTN only here with  2 days of severe epigastric abd pain, started after eating very spicy chips.  Pt. denies other inciting event/trauma.  Pt. feels like his epigastrium is swollen, which is not normal for him.  He feels otherwise well.: negative for fever, cough, headache, chest pain, shortness of breath, nausea, vomiting, diarrhea, rash, paresthesia, and focal weakness-    Pt denies heavy drinking or hx of pancreatitis. Denies any HLD or gallstones hx.  (11 Jun 2023 06:57)    patient wss able to tolerate  diet with plan  to dc home

## 2023-06-12 NOTE — PROGRESS NOTE ADULT - SUBJECTIVE AND OBJECTIVE BOX
****************************************  Lorna Sanders MD  Available on [employer] Teams  ****************************************  SUBJECTIVE    Patient seen and examined at bedside. No acute events overnight  Reported  Denied HA, CP, SOB, n/v/d/c , fevers, chills    OBJECTIVE     Vital Signs Last 24 Hrs  T(C): 36.8 (2023 06:38), Max: 36.9 (10 Mathew 2023 21:20)  T(F): 98.2 (2023 06:38), Max: 98.4 (10 Mathew 2023 21:20)  HR: 78 (2023 06:38) (78 - 89)  BP: 130/88 (2023 06:38) (126/85 - 155/103)  BP(mean): 98 (2023 03:55) (98 - 98)  RR: 18 (2023 06:38) (18 - 18)  SpO2: 95% (2023 06:38) (95% - 98%)    Parameters below as of 2023 06:38  Patient On (Oxygen Delivery Method): room air        PHYSICAL EXAM:  Constitutional: non-toxic, no distress  HEAD/EYES: anicteric, no conjunctival injection  ENT:  supple, no thrush  Cardiovascular:   normal S1, S2, no murmur, no edema  Respiratory:  clear BS bilaterally, no wheezes, no rales  GI:  soft, non-tender, normal bowel sounds  :  no francois, no CVA tenderness  Musculoskeletal:  no synovitis, normal ROM  Neurologic: awake and alert, normal strength, no focal findings  Skin:  no rash, no erythema, no phlebitis  Heme/Onc: no lymphadenopathy   Psychiatric:  awake, alert, appropriate mood          HOSPITAL MEDICATIONS      amLODIPine   Tablet 5 milliGRAM(s) Oral daily        acetaminophen     Tablet .. 650 milliGRAM(s) Oral every 6 hours PRN  melatonin 3 milliGRAM(s) Oral at bedtime PRN  morphine  - Injectable 4 milliGRAM(s) IV Push every 6 hours PRN  morphine  - Injectable 2 milliGRAM(s) IV Push every 6 hours PRN  ondansetron Injectable 4 milliGRAM(s) IV Push every 8 hours PRN    aluminum hydroxide/magnesium hydroxide/simethicone Suspension 30 milliLiter(s) Oral every 4 hours PRN  pantoprazole  Injectable 40 milliGRAM(s) IV Push daily        sodium chloride 0.9%. 1000 milliLiter(s) IV Continuous <Continuous>        naloxone Injectable 0.4 milliGRAM(s) IV Push once      LABS                        15.7   9.02  )-----------( 303      ( 2023 00:56 )             47.1       06-11    140  |  105  |  11  ----------------------------<  100<H>  4.3   |  29  |  1.15    Ca    9.4      2023 00:56    TPro  7.0  /  Alb  3.5  /  TBili  1.3<H>  /  DBili  x   /  AST  17  /  ALT  38  /  AlkPhos  71  06-11      Urinalysis Basic - ( 2023 01:24 )    Color: Yellow / Appearance: Clear / S.010 / pH: x  Gluc: x / Ketone: Negative  / Bili: Negative / Urobili: Negative mg/dL   Blood: x / Protein: Negative mg/dL / Nitrite: Negative   Leuk Esterase: Negative / RBC: x / WBC x   Sq Epi: x / Non Sq Epi: x / Bacteria: x        Follow Up:      RADIOLOGY:
HPI:  47 yo M with pmh of HTN only here with  2 days of severe epigastric abd pain, started after eating very spicy chips.  Pt. denies other inciting event/trauma.  Pt. feels like his epigastrium is swollen, which is not normal for him.  He feels otherwise well.: negative for fever, cough, headache, chest pain, shortness of breath, nausea, vomiting, diarrhea, rash, paresthesia, and focal weakness-    Pt denies heavy drinking or hx of pancreatitis. Denies any HLD or gallstones hx.  (2023 06:57)    Patient is a 48y old  Male who presents with a chief complaint of pancreatitis (2023 06:57)      INTERVAL HPI/OVERNIGHT EVENTS: new admission no acute events     MEDICATIONS  (STANDING):  amLODIPine   Tablet 5 milliGRAM(s) Oral daily  naloxone Injectable 0.4 milliGRAM(s) IV Push once  pantoprazole  Injectable 40 milliGRAM(s) IV Push daily    MEDICATIONS  (PRN):  acetaminophen     Tablet .. 650 milliGRAM(s) Oral every 6 hours PRN Temp greater or equal to 38C (100.4F), Mild Pain (1 - 3)  aluminum hydroxide/magnesium hydroxide/simethicone Suspension 30 milliLiter(s) Oral every 4 hours PRN Dyspepsia  melatonin 3 milliGRAM(s) Oral at bedtime PRN Insomnia  morphine  - Injectable 4 milliGRAM(s) IV Push every 6 hours PRN Severe Pain (7 - 10)  morphine  - Injectable 2 milliGRAM(s) IV Push every 6 hours PRN Moderate Pain (4 - 6)  ondansetron Injectable 4 milliGRAM(s) IV Push every 8 hours PRN Nausea and/or Vomiting      Allergies    No Known Allergies    Intolerances        REVIEW OF SYSTEMS:  CONSTITUTIONAL: No fever, weight loss, or fatigue  EYES: No eye pain, visual disturbances, or discharge  ENMT:  No difficulty hearing, tinnitus, vertigo; No sinus or throat pain  NECK: No pain or stiffness  BREASTS: No pain, masses, or nipple discharge  RESPIRATORY: No cough, wheezing, chills or hemoptysis; No shortness of breath  CARDIOVASCULAR: No chest pain, palpitations, dizziness, or leg swelling  GASTROINTESTINAL: improved abdominal pain   GENITOURINARY: No dysuria, frequency, hematuria, or incontinence  NEUROLOGICAL: No headaches, memory loss, loss of strength, numbness, or tremors  SKIN: No itching, burning, rashes, or lesions   LYMPH NODES: No enlarged glands  ENDOCRINE: No heat or cold intolerance; No hair loss  MUSCULOSKELETAL: No joint pain or swelling; No muscle, back, or extremity pain  PSYCHIATRIC: No depression, anxiety, mood swings, or difficulty sleeping  HEME/LYMPH: No easy bruising, or bleeding gums  ALLERGY AND IMMUNOLOGIC: No hives or eczema    Vital Signs Last 24 Hrs  T(C): 37.1 (2023 05:08), Max: 37.1 (2023 05:08)  T(F): 98.7 (2023 05:08), Max: 98.7 (2023 05:08)  HR: 86 (2023 11:07) (84 - 88)  BP: 109/70 (2023 11:07) (109/70 - 141/96)  RR: 18 (2023 11:07) (17 - 18)  SpO2: 96% (2023 11:07) (95% - 96%)    Parameters below as of 2023 11:07  Patient On (Oxygen Delivery Method): room air        PHYSICAL EXAM:  GENERAL: NAD, well-groomed, well-developed  HEAD:  Atraumatic, Normocephalic  EYES: EOMI, PERRLA, conjunctiva and sclera clear  ENMT: No tonsillar erythema, exudates, or enlargement; Moist mucous membranes, Good dentition, No lesions  NECK: Supple, No JVD, Normal thyroid  NERVOUS SYSTEM:  Alert & Oriented X3, Good concentration; Motor Strength 5/5 B/L upper and lower extremities; DTRs 2+ intact and symmetric  CHEST/LUNG: Clear to ascultation  bilaterally; No rales, rhonchi, wheezing, or rubs  HEART: Regular rate and rhythm; No murmurs, rubs, or gallops  ABDOMEN: Soft, mild epigastric tenderness   EXTREMITIES:  2+ Peripheral Pulses, No clubbing, cyanosis, or edema  LYMPH: No lymphadenopathy noted  SKIN: No rashes or lesions    LABS:                        15.2   8.67  )-----------( 289      ( 2023 06:00 )             46.1     06-12    139  |  107  |  9   ----------------------------<  82  3.9   |  25  |  0.96    Ca    8.7      2023 06:00  Phos  2.7     06-12  Mg     2.2     06-12    TPro  6.6  /  Alb  3.2<L>  /  TBili  1.2  /  DBili  x   /  AST  14<L>  /  ALT  33  /  AlkPhos  71  06-12      Urinalysis Basic - ( 2023 01:24 )    Color: Yellow / Appearance: Clear / S.010 / pH: x  Gluc: x / Ketone: Negative  / Bili: Negative / Urobili: Negative mg/dL   Blood: x / Protein: Negative mg/dL / Nitrite: Negative   Leuk Esterase: Negative / RBC: x / WBC x   Sq Epi: x / Non Sq Epi: x / Bacteria: x      CAPILLARY BLOOD GLUCOSE          RADIOLOGY & ADDITIONAL TESTS:  MPRESSION:  Steatosis and hepatomegaly.    Mild peripancreatic inflammatory changes adjacent to the pancreatic head   extending into the anterior pararenal space consistent with   manifestations of acute pancreatitis. No evidence pancreatic necrosis. No   pseudocyst or abscess.    No bowel obstruction, free fluid, free air or abscess.    Please refer to detailed findings otherwise described above.      Imaging Personally Reviewed:  [ ] YES  [ ] NO    Consultant(s) Notes Reviewed:  [ ] YES  [ ] NO    Care Discussed with Consultants/Other Providers [ ] YES  [ ] NO

## 2023-06-12 NOTE — DISCHARGE NOTE PROVIDER - PROVIDER TOKENS
FREE:[LAST:[pmd],FIRST:[your],PHONE:[(   )    -],FAX:[(   )    -]],PROVIDER:[TOKEN:[[id number]]]

## 2023-06-15 DIAGNOSIS — K85.90 ACUTE PANCREATITIS WITHOUT NECROSIS OR INFECTION, UNSPECIFIED: ICD-10-CM

## 2023-06-15 DIAGNOSIS — Z79.899 OTHER LONG TERM (CURRENT) DRUG THERAPY: ICD-10-CM

## 2023-06-15 DIAGNOSIS — I10 ESSENTIAL (PRIMARY) HYPERTENSION: ICD-10-CM

## 2023-06-15 DIAGNOSIS — R10.13 EPIGASTRIC PAIN: ICD-10-CM

## 2024-05-20 NOTE — PATIENT PROFILE ADULT - VISION (WITH CORRECTIVE LENSES IF THE PATIENT USUALLY WEARS THEM):
Pt called stating she needed to schedule a CC apt for a PD catheter insertion and Dr Phelps is asking for the clearance.  Advised pt to contact Dr Phelps to fax request along with all the surgical details   Normal vision: sees adequately in most situations; can see medication labels, newsprint